# Patient Record
Sex: FEMALE | Race: WHITE | NOT HISPANIC OR LATINO | Employment: FULL TIME | URBAN - NONMETROPOLITAN AREA
[De-identification: names, ages, dates, MRNs, and addresses within clinical notes are randomized per-mention and may not be internally consistent; named-entity substitution may affect disease eponyms.]

---

## 2023-04-19 DIAGNOSIS — M06.9 RHEUMATOID ARTHRITIS, UNSPECIFIED (MULTI): ICD-10-CM

## 2023-04-19 DIAGNOSIS — E78.5 HYPERLIPIDEMIA, UNSPECIFIED: ICD-10-CM

## 2023-04-19 PROBLEM — K21.9 GERD (GASTROESOPHAGEAL REFLUX DISEASE): Status: ACTIVE | Noted: 2023-04-19

## 2023-04-19 PROBLEM — F41.9 ANXIETY AND DEPRESSION: Status: ACTIVE | Noted: 2023-04-19

## 2023-04-19 PROBLEM — F32.A ANXIETY AND DEPRESSION: Status: ACTIVE | Noted: 2023-04-19

## 2023-04-19 PROBLEM — I10 ESSENTIAL HYPERTENSION: Status: ACTIVE | Noted: 2023-04-19

## 2023-04-19 PROBLEM — M79.641 PAIN OF RIGHT HAND: Status: ACTIVE | Noted: 2023-04-19

## 2023-04-19 PROBLEM — E87.6 LOW BLOOD POTASSIUM: Status: ACTIVE | Noted: 2023-04-19

## 2023-04-19 PROBLEM — F43.20 ADJUSTMENT REACTION: Status: ACTIVE | Noted: 2023-04-19

## 2023-04-19 PROBLEM — R20.2 PARESTHESIA OF LOWER LIP: Status: ACTIVE | Noted: 2023-04-19

## 2023-04-19 PROBLEM — R76.8 ELEVATED RHEUMATOID FACTOR: Status: ACTIVE | Noted: 2023-04-19

## 2023-04-19 PROBLEM — R20.0 NUMBNESS OF RIGHT HAND: Status: ACTIVE | Noted: 2023-04-19

## 2023-04-19 PROBLEM — N89.8 VAGINAL IRRITATION: Status: ACTIVE | Noted: 2023-04-19

## 2023-04-19 PROBLEM — M19.049 DEGENERATIVE JOINT DISEASE OF HAND: Status: ACTIVE | Noted: 2023-04-19

## 2023-04-19 RX ORDER — ATORVASTATIN CALCIUM 40 MG/1
TABLET, FILM COATED ORAL
Qty: 90 TABLET | Refills: 3 | Status: SHIPPED | OUTPATIENT
Start: 2023-04-19 | End: 2023-06-05 | Stop reason: SDUPTHER

## 2023-04-19 RX ORDER — MELOXICAM 15 MG/1
TABLET ORAL
Qty: 30 TABLET | Refills: 7 | Status: SHIPPED | OUTPATIENT
Start: 2023-04-19 | End: 2023-06-05 | Stop reason: SDUPTHER

## 2023-04-19 RX ORDER — MELOXICAM 15 MG/1
15 TABLET ORAL DAILY PRN
COMMUNITY
End: 2023-06-05 | Stop reason: ALTCHOICE

## 2023-04-19 RX ORDER — ATORVASTATIN CALCIUM 40 MG/1
40 TABLET, FILM COATED ORAL DAILY
COMMUNITY
End: 2023-10-02 | Stop reason: SDUPTHER

## 2023-06-03 LAB
ALANINE AMINOTRANSFERASE (SGPT) (U/L) IN SER/PLAS: 9 U/L (ref 7–45)
ALBUMIN (G/DL) IN SER/PLAS: 4.1 G/DL (ref 3.4–5)
ALKALINE PHOSPHATASE (U/L) IN SER/PLAS: 61 U/L (ref 33–136)
ASPARTATE AMINOTRANSFERASE (SGOT) (U/L) IN SER/PLAS: 20 U/L (ref 9–39)
BILIRUBIN DIRECT (MG/DL) IN SER/PLAS: 0.2 MG/DL (ref 0–0.3)
BILIRUBIN TOTAL (MG/DL) IN SER/PLAS: 1.1 MG/DL (ref 0–1.2)
C REACTIVE PROTEIN (MG/L) IN SER/PLAS: 0.58 MG/DL
CALCIDIOL (25 OH VITAMIN D3) (NG/ML) IN SER/PLAS: 43 NG/ML
CREATININE (MG/DL) IN SER/PLAS: 0.81 MG/DL (ref 0.5–1.05)
ERYTHROCYTE DISTRIBUTION WIDTH (RATIO) BY AUTOMATED COUNT: 12.6 % (ref 11.5–14.5)
ERYTHROCYTE MEAN CORPUSCULAR HEMOGLOBIN CONCENTRATION (G/DL) BY AUTOMATED: 32.8 G/DL (ref 32–36)
ERYTHROCYTE MEAN CORPUSCULAR VOLUME (FL) BY AUTOMATED COUNT: 95 FL (ref 80–100)
ERYTHROCYTES (10*6/UL) IN BLOOD BY AUTOMATED COUNT: 4.23 X10E12/L (ref 4–5.2)
GFR FEMALE: 80 ML/MIN/1.73M2
HEMATOCRIT (%) IN BLOOD BY AUTOMATED COUNT: 40 % (ref 36–46)
HEMOGLOBIN (G/DL) IN BLOOD: 13.1 G/DL (ref 12–16)
LEUKOCYTES (10*3/UL) IN BLOOD BY AUTOMATED COUNT: 4.2 X10E9/L (ref 4.4–11.3)
NRBC (PER 100 WBCS) BY AUTOMATED COUNT: 0 /100 WBC (ref 0–0)
PLATELETS (10*3/UL) IN BLOOD AUTOMATED COUNT: 278 X10E9/L (ref 150–450)
PROTEIN TOTAL: 7.1 G/DL (ref 6.4–8.2)
SEDIMENTATION RATE, ERYTHROCYTE: 21 MM/H (ref 0–30)
UREA NITROGEN (MG/DL) IN SER/PLAS: 21 MG/DL (ref 6–23)

## 2023-06-04 RX ORDER — PANTOPRAZOLE SODIUM 40 MG/1
40 TABLET, DELAYED RELEASE ORAL DAILY
COMMUNITY
Start: 2014-07-23 | End: 2024-02-06 | Stop reason: SDUPTHER

## 2023-06-04 RX ORDER — ACETAMINOPHEN 500 MG
2 TABLET ORAL 2 TIMES DAILY
COMMUNITY
Start: 2018-12-18

## 2023-06-04 RX ORDER — HYDROXYCHLOROQUINE SULFATE 200 MG/1
200 TABLET, FILM COATED ORAL 2 TIMES DAILY
COMMUNITY
Start: 2021-06-28

## 2023-06-04 RX ORDER — ESCITALOPRAM OXALATE 20 MG/1
20 TABLET ORAL DAILY
COMMUNITY
Start: 2015-12-17 | End: 2023-09-29 | Stop reason: SDUPTHER

## 2023-06-04 RX ORDER — HYDROCHLOROTHIAZIDE 25 MG/1
25 TABLET ORAL DAILY
COMMUNITY
Start: 2021-03-19 | End: 2023-11-07

## 2023-06-05 ENCOUNTER — OFFICE VISIT (OUTPATIENT)
Dept: PRIMARY CARE | Facility: CLINIC | Age: 63
End: 2023-06-05
Payer: COMMERCIAL

## 2023-06-05 VITALS
OXYGEN SATURATION: 95 % | TEMPERATURE: 97.2 F | WEIGHT: 246.9 LBS | HEART RATE: 76 BPM | BODY MASS INDEX: 41.09 KG/M2 | SYSTOLIC BLOOD PRESSURE: 115 MMHG | DIASTOLIC BLOOD PRESSURE: 72 MMHG

## 2023-06-05 DIAGNOSIS — E78.5 HYPERLIPIDEMIA, UNSPECIFIED HYPERLIPIDEMIA TYPE: ICD-10-CM

## 2023-06-05 DIAGNOSIS — M05.79 RHEUMATOID ARTHRITIS INVOLVING MULTIPLE SITES WITH POSITIVE RHEUMATOID FACTOR (MULTI): ICD-10-CM

## 2023-06-05 DIAGNOSIS — M19.042 PRIMARY OSTEOARTHRITIS OF BOTH HANDS: ICD-10-CM

## 2023-06-05 DIAGNOSIS — D72.810 LYMPHOPENIA: Primary | ICD-10-CM

## 2023-06-05 DIAGNOSIS — Z00.00 ENCOUNTER FOR WELLNESS EXAMINATION IN ADULT: ICD-10-CM

## 2023-06-05 DIAGNOSIS — I10 ESSENTIAL HYPERTENSION: ICD-10-CM

## 2023-06-05 DIAGNOSIS — R76.8 ELEVATED RHEUMATOID FACTOR: ICD-10-CM

## 2023-06-05 DIAGNOSIS — J30.1 SEASONAL ALLERGIC RHINITIS DUE TO POLLEN: ICD-10-CM

## 2023-06-05 DIAGNOSIS — M19.041 PRIMARY OSTEOARTHRITIS OF BOTH HANDS: ICD-10-CM

## 2023-06-05 PROCEDURE — 99214 OFFICE O/P EST MOD 30 MIN: CPT | Performed by: FAMILY MEDICINE

## 2023-06-05 PROCEDURE — 1159F MED LIST DOCD IN RCRD: CPT | Performed by: FAMILY MEDICINE

## 2023-06-05 PROCEDURE — 3074F SYST BP LT 130 MM HG: CPT | Performed by: FAMILY MEDICINE

## 2023-06-05 PROCEDURE — 3078F DIAST BP <80 MM HG: CPT | Performed by: FAMILY MEDICINE

## 2023-06-05 PROCEDURE — 1036F TOBACCO NON-USER: CPT | Performed by: FAMILY MEDICINE

## 2023-06-05 RX ORDER — CHOLECALCIFEROL (VITAMIN D3) 50 MCG
50 TABLET ORAL DAILY
COMMUNITY

## 2023-06-05 ASSESSMENT — ANXIETY QUESTIONNAIRES
2. NOT BEING ABLE TO STOP OR CONTROL WORRYING: NOT AT ALL
6. BECOMING EASILY ANNOYED OR IRRITABLE: NOT AT ALL
5. BEING SO RESTLESS THAT IT IS HARD TO SIT STILL: NOT AT ALL
7. FEELING AFRAID AS IF SOMETHING AWFUL MIGHT HAPPEN: NOT AT ALL
4. TROUBLE RELAXING: NOT AT ALL
1. FEELING NERVOUS, ANXIOUS, OR ON EDGE: NOT AT ALL

## 2023-06-05 ASSESSMENT — ENCOUNTER SYMPTOMS
CARDIOVASCULAR NEGATIVE: 1
ALLERGIC/IMMUNOLOGIC NEGATIVE: 1
ENDOCRINE NEGATIVE: 1
ARTHRALGIAS: 1
CHEST TIGHTNESS: 0
SHORTNESS OF BREATH: 1
JOINT SWELLING: 1
GASTROINTESTINAL NEGATIVE: 1
HEMATOLOGIC/LYMPHATIC NEGATIVE: 1
EYES NEGATIVE: 1
NEUROLOGICAL NEGATIVE: 1
PSYCHIATRIC NEGATIVE: 1
CONSTITUTIONAL NEGATIVE: 1

## 2023-06-05 ASSESSMENT — PATIENT HEALTH QUESTIONNAIRE - PHQ9
SUM OF ALL RESPONSES TO PHQ9 QUESTIONS 1 AND 2: 0
1. LITTLE INTEREST OR PLEASURE IN DOING THINGS: NOT AT ALL
2. FEELING DOWN, DEPRESSED OR HOPELESS: NOT AT ALL

## 2023-06-05 NOTE — PATIENT INSTRUCTIONS
1.  Rheumatoid arthritis    We had a good discussion regards to your inflammatory arthritis.  You are currently taking Plaquenil.   would like to get a second opinion on your current treatment plan.  If you need a referral in order to get into see a new rheumatologist please let us know.  In the meantime I would stay on the Plaquenil and Tylenol    2.  Left wrist pain.  I am concerned you may have a small ganglion cyst along the side of the wrist.  I do think a splint at least at bedtime might help protect a little bit maybe it will get smaller if it starts to get larger probably should have you meet with a hand surgeon for further evaluate    3.  Hypertension.        The blood pressure is normal please stay on the hydrochlorothiazide 25 mg a day by lowering blood pressure we do reduce risk for heart attack and stroke    4.  Elevated cholesterol    Please stay on the atorvastatin 40 mg a day in addition try to eat a heart healthy diet a diet rich with 5-7 servings of fresh fruits and vegetables every day in addition to lean proteins avoiding simple sugars and fast foods.  Try to walk be active 30 minutes of activity every day is ideal certainly you would call if you have any chest pain chest pressure chest tightness with activities    We will have you get fasting labs at your convenience in 2 weeks    5.  Recent labs indicate a low white blood cell count.  I am not sure if this is from your Plaquenil but could be a lab error    I am recommending a repeat blood count in 2 weeks at that time you can have other labs done I would go fasting meaning you can have a large glass of water that morning but no food    6.  Please stay on current doses of Lexapro 20 mg a day.  Continue getting at least 6 hours of sleep at night.  Try to be outside [place] window for 30 minutes and natural sunlight every day.  Continue with a healthy relationship with your family and friends.  And continue with daily prayer and meditation if you  think your moods are worsening please call    7.  You are up-to-date with your mammogram and colonoscopy    8.  We will contact you with results of labs you will contact me if anything changes I will see you back in 6 months but am happy to see you sooner if needed

## 2023-06-05 NOTE — PROGRESS NOTES
Subjective   Patient ID: Brianna Olivo is a 63 y.o. female who presents for Follow-up (6 month follow up chol, anxiety).    HPI     Patient notes when she reduced her Plaquenil she felt the same amount of joint pain and experienced the same amount of swelling. She would like to stop seeing her rheumatologist. Patient denies chest tightness, but notes SOB occasionally. She denies SOB upon exertion. Patient has increased her walking. She notes her wrist is swollen and uncomfortable. Patient denies acid indigestion, and reports minor postnasal drip in the mornings.  Review of Systems   Constitutional: Negative.    HENT:  Positive for postnasal drip.    Eyes: Negative.    Respiratory:  Positive for shortness of breath. Negative for chest tightness.    Cardiovascular: Negative.    Gastrointestinal: Negative.    Endocrine: Negative.    Genitourinary: Negative.    Musculoskeletal:  Positive for arthralgias and joint swelling.   Skin: Negative.    Allergic/Immunologic: Negative.    Neurological: Negative.    Hematological: Negative.    Psychiatric/Behavioral: Negative.         Objective   /72 (BP Location: Left arm, Patient Position: Sitting, BP Cuff Size: Adult long)   Pulse 76   Temp 36.2 °C (97.2 °F) (Temporal)   Wt 112 kg (246 lb 14.4 oz)   SpO2 95%   BMI 41.09 kg/m²     Physical Exam  Constitutional:       Appearance: Normal appearance.   HENT:      Head: Normocephalic and atraumatic.      Right Ear: Tympanic membrane normal.      Left Ear: Tympanic membrane normal.      Nose: Nose normal.      Mouth/Throat:      Mouth: Mucous membranes are moist.      Pharynx: Oropharynx is clear.   Eyes:      Extraocular Movements: Extraocular movements intact.      Conjunctiva/sclera: Conjunctivae normal.      Pupils: Pupils are equal, round, and reactive to light.   Cardiovascular:      Rate and Rhythm: Normal rate and regular rhythm.      Pulses: Normal pulses.      Heart sounds: Normal heart sounds.   Pulmonary:       Effort: Pulmonary effort is normal.      Breath sounds: Normal breath sounds.   Abdominal:      General: Bowel sounds are normal.      Palpations: Abdomen is soft.   Musculoskeletal:         General: Normal range of motion.      Cervical back: Normal range of motion and neck supple.   Skin:     General: Skin is warm.      Comments: Small swollen area on lateral aspect of left wrist. Tender to palpate.    Neurological:      Mental Status: She is alert and oriented to person, place, and time.   Psychiatric:         Mood and Affect: Mood normal.         Behavior: Behavior normal.       Assessment/Plan   Diagnoses and all orders for this visit:  Lymphopenia  -     CBC and Auto Differential; Future  Hyperlipidemia, unspecified hyperlipidemia type  -     Lipid Panel; Future  -     Comprehensive Metabolic Panel; Future  -     Hemoglobin A1C; Future  -     TSH with reflex to Free T4 if abnormal; Future  -     Follow Up In [facility] - PCP; Future  Elevated rheumatoid factor  -     Hemoglobin A1C; Future  Rheumatoid arthritis involving multiple sites with positive rheumatoid factor (CMS/[facility])  Encounter for wellness examination in adult  -     Follow Up In [facility] - PCP; Future  Essential hypertension  Primary osteoarthritis of both hands  Seasonal allergic rhinitis due to pollen      1.  Rheumatoid arthritis    We had a good discussion regards to your inflammatory arthritis.  You are currently taking Plaquenil.   would like to get a second opinion on your current treatment plan.  If you need a referral in order to get into see a new rheumatologist please let us know.  In the meantime I would stay on the Plaquenil and Tylenol    2.  Left wrist pain.  I am concerned you may have a small ganglion cyst along the side of the wrist.  I do think a splint at least at bedtime might help protect a little bit maybe it will get smaller if it starts to get larger probably should have you meet with a hand  surgeon for further evaluate    3.  Hypertension.    The blood pressure is normal please stay on the hydrochlorothiazide 25 mg a day by lowering blood pressure we do reduce risk for heart attack and stroke    4.  Elevated cholesterol    Please stay on the atorvastatin 40 mg a day in addition try to eat a heart healthy diet a diet rich with 5-7 servings of fresh fruits and vegetables every day in addition to lean proteins avoiding simple sugars and fast foods.  Try to walk be active 30 minutes of activity every day is ideal certainly you would call if you have any chest pain chest pressure chest tightness with activities    We will have you get fasting labs at your convenience in 2 weeks    5.  Recent labs indicate a low white blood cell count.  I am not sure if this is from your Plaquenil but could be a lab error    I am recommending a repeat blood count in 2 weeks at that time you can have other labs done I would go fasting meaning you can have a large glass of water that morning but no food    6.  Please stay on current doses of Lexapro 20 mg a day.  Continue getting at least 6 hours of sleep at night.  Try to be outside [place] window for 30 minutes and natural sunlight every day.  Continue with a healthy relationship with your family and friends.  And continue with daily prayer and meditation if you think your moods are worsening please call    7.  You are up-to-date with your mammogram and colonoscopy    8.  We will contact you with results of labs you will contact me if anything changes I will see you back in 6 months but am happy to see you sooner if needed      Scribe Attestation  By signing my name below, ILing , Scrdarcy   attest that this documentation has been prepared under the direction and in the presence of Amandeep Mora MD.

## 2023-06-06 PROBLEM — D72.810 LYMPHOPENIA: Status: ACTIVE | Noted: 2023-06-06

## 2023-06-23 ENCOUNTER — APPOINTMENT (OUTPATIENT)
Dept: LAB | Facility: LAB | Age: 63
End: 2023-06-23
Payer: COMMERCIAL

## 2023-06-23 ENCOUNTER — LAB (OUTPATIENT)
Dept: LAB | Facility: LAB | Age: 63
End: 2023-06-23
Payer: COMMERCIAL

## 2023-06-23 DIAGNOSIS — R76.8 ELEVATED RHEUMATOID FACTOR: ICD-10-CM

## 2023-06-23 DIAGNOSIS — E78.5 HYPERLIPIDEMIA, UNSPECIFIED HYPERLIPIDEMIA TYPE: ICD-10-CM

## 2023-06-23 DIAGNOSIS — D72.810 LYMPHOPENIA: ICD-10-CM

## 2023-06-23 LAB
ALANINE AMINOTRANSFERASE (SGPT) (U/L) IN SER/PLAS: 8 U/L (ref 7–45)
ALBUMIN (G/DL) IN SER/PLAS: 4.1 G/DL (ref 3.4–5)
ALKALINE PHOSPHATASE (U/L) IN SER/PLAS: 67 U/L (ref 33–136)
ANION GAP IN SER/PLAS: 14 MMOL/L (ref 10–20)
ASPARTATE AMINOTRANSFERASE (SGOT) (U/L) IN SER/PLAS: 19 U/L (ref 9–39)
BASOPHILS (10*3/UL) IN BLOOD BY AUTOMATED COUNT: 0.04 X10E9/L (ref 0–0.1)
BASOPHILS/100 LEUKOCYTES IN BLOOD BY AUTOMATED COUNT: 0.7 % (ref 0–2)
BILIRUBIN TOTAL (MG/DL) IN SER/PLAS: 1 MG/DL (ref 0–1.2)
CALCIUM (MG/DL) IN SER/PLAS: 10.2 MG/DL (ref 8.6–10.6)
CARBON DIOXIDE, TOTAL (MMOL/L) IN SER/PLAS: 29 MMOL/L (ref 21–32)
CHLORIDE (MMOL/L) IN SER/PLAS: 103 MMOL/L (ref 98–107)
CHOLESTEROL (MG/DL) IN SER/PLAS: 175 MG/DL (ref 0–199)
CHOLESTEROL IN HDL (MG/DL) IN SER/PLAS: 65.6 MG/DL
CHOLESTEROL/HDL RATIO: 2.7
CREATININE (MG/DL) IN SER/PLAS: 0.88 MG/DL (ref 0.5–1.05)
EOSINOPHILS (10*3/UL) IN BLOOD BY AUTOMATED COUNT: 0.14 X10E9/L (ref 0–0.7)
EOSINOPHILS/100 LEUKOCYTES IN BLOOD BY AUTOMATED COUNT: 2.5 % (ref 0–6)
ERYTHROCYTE DISTRIBUTION WIDTH (RATIO) BY AUTOMATED COUNT: 13 % (ref 11.5–14.5)
ERYTHROCYTE MEAN CORPUSCULAR HEMOGLOBIN CONCENTRATION (G/DL) BY AUTOMATED: 32.4 G/DL (ref 32–36)
ERYTHROCYTE MEAN CORPUSCULAR VOLUME (FL) BY AUTOMATED COUNT: 96 FL (ref 80–100)
ERYTHROCYTES (10*6/UL) IN BLOOD BY AUTOMATED COUNT: 4.2 X10E12/L (ref 4–5.2)
ESTIMATED AVERAGE GLUCOSE FOR HBA1C: 108 MG/DL
GFR FEMALE: 73 ML/MIN/1.73M2
GLUCOSE (MG/DL) IN SER/PLAS: 98 MG/DL (ref 74–99)
HEMATOCRIT (%) IN BLOOD BY AUTOMATED COUNT: 40.4 % (ref 36–46)
HEMOGLOBIN (G/DL) IN BLOOD: 13.1 G/DL (ref 12–16)
HEMOGLOBIN A1C/HEMOGLOBIN TOTAL IN BLOOD: 5.4 %
IMMATURE GRANULOCYTES/100 LEUKOCYTES IN BLOOD BY AUTOMATED COUNT: 0.5 % (ref 0–0.9)
LDL: 94 MG/DL (ref 0–99)
LEUKOCYTES (10*3/UL) IN BLOOD BY AUTOMATED COUNT: 5.7 X10E9/L (ref 4.4–11.3)
LYMPHOCYTES (10*3/UL) IN BLOOD BY AUTOMATED COUNT: 1.55 X10E9/L (ref 1.2–4.8)
LYMPHOCYTES/100 LEUKOCYTES IN BLOOD BY AUTOMATED COUNT: 27.3 % (ref 13–44)
MONOCYTES (10*3/UL) IN BLOOD BY AUTOMATED COUNT: 0.59 X10E9/L (ref 0.1–1)
MONOCYTES/100 LEUKOCYTES IN BLOOD BY AUTOMATED COUNT: 10.4 % (ref 2–10)
NEUTROPHILS (10*3/UL) IN BLOOD BY AUTOMATED COUNT: 3.33 X10E9/L (ref 1.2–7.7)
NEUTROPHILS/100 LEUKOCYTES IN BLOOD BY AUTOMATED COUNT: 58.6 % (ref 40–80)
NRBC (PER 100 WBCS) BY AUTOMATED COUNT: 0 /100 WBC (ref 0–0)
PLATELETS (10*3/UL) IN BLOOD AUTOMATED COUNT: 289 X10E9/L (ref 150–450)
POTASSIUM (MMOL/L) IN SER/PLAS: 3.9 MMOL/L (ref 3.5–5.3)
PROTEIN TOTAL: 7.1 G/DL (ref 6.4–8.2)
SODIUM (MMOL/L) IN SER/PLAS: 142 MMOL/L (ref 136–145)
THYROTROPIN (MIU/L) IN SER/PLAS BY DETECTION LIMIT <= 0.05 MIU/L: 1.2 MIU/L (ref 0.44–3.98)
TRIGLYCERIDE (MG/DL) IN SER/PLAS: 79 MG/DL (ref 0–149)
UREA NITROGEN (MG/DL) IN SER/PLAS: 23 MG/DL (ref 6–23)
VLDL: 16 MG/DL (ref 0–40)

## 2023-06-23 PROCEDURE — 84443 ASSAY THYROID STIM HORMONE: CPT

## 2023-06-23 PROCEDURE — 83036 HEMOGLOBIN GLYCOSYLATED A1C: CPT

## 2023-06-23 PROCEDURE — 85025 COMPLETE CBC W/AUTO DIFF WBC: CPT

## 2023-06-23 PROCEDURE — 80053 COMPREHEN METABOLIC PANEL: CPT

## 2023-06-23 PROCEDURE — 80061 LIPID PANEL: CPT

## 2023-06-23 PROCEDURE — 36415 COLL VENOUS BLD VENIPUNCTURE: CPT

## 2023-09-29 ENCOUNTER — TELEPHONE (OUTPATIENT)
Dept: PRIMARY CARE | Facility: CLINIC | Age: 63
End: 2023-09-29

## 2023-09-29 DIAGNOSIS — E78.2 MIXED HYPERLIPIDEMIA: Primary | ICD-10-CM

## 2023-09-29 DIAGNOSIS — F43.20 ADJUSTMENT DISORDER, UNSPECIFIED TYPE: ICD-10-CM

## 2023-09-29 RX ORDER — ESCITALOPRAM OXALATE 20 MG/1
20 TABLET ORAL DAILY
Qty: 90 TABLET | Refills: 3 | Status: SHIPPED | OUTPATIENT
Start: 2023-09-29 | End: 2023-10-05

## 2023-09-29 NOTE — TELEPHONE ENCOUNTER
Calling with a drug interaction of Lexapro and plaquenil. Arrhythmia and serotonin level. She is aware that pt has been on this for years but they have a new system so she had to call and verify.

## 2023-10-02 ENCOUNTER — TELEPHONE (OUTPATIENT)
Dept: PRIMARY CARE | Facility: CLINIC | Age: 63
End: 2023-10-02

## 2023-10-02 DIAGNOSIS — E78.5 HYPERLIPIDEMIA, UNSPECIFIED HYPERLIPIDEMIA TYPE: Primary | ICD-10-CM

## 2023-10-02 RX ORDER — ATORVASTATIN CALCIUM 40 MG/1
40 TABLET, FILM COATED ORAL DAILY
Qty: 90 TABLET | Refills: 3 | Status: SHIPPED | OUTPATIENT
Start: 2023-10-02 | End: 2024-10-01

## 2023-10-02 RX ORDER — ATORVASTATIN CALCIUM 40 MG/1
40 TABLET, FILM COATED ORAL DAILY
Qty: 90 TABLET | Refills: 2 | Status: CANCELLED | OUTPATIENT
Start: 2023-10-02 | End: 2024-10-01

## 2023-11-07 DIAGNOSIS — I10 ESSENTIAL (PRIMARY) HYPERTENSION: ICD-10-CM

## 2023-11-07 RX ORDER — HYDROCHLOROTHIAZIDE 25 MG/1
25 TABLET ORAL DAILY
Qty: 90 TABLET | Refills: 3 | Status: SHIPPED | OUTPATIENT
Start: 2023-11-07

## 2023-12-11 ENCOUNTER — APPOINTMENT (OUTPATIENT)
Dept: PRIMARY CARE | Facility: CLINIC | Age: 63
End: 2023-12-11
Payer: COMMERCIAL

## 2024-02-06 ENCOUNTER — OFFICE VISIT (OUTPATIENT)
Dept: PRIMARY CARE | Facility: CLINIC | Age: 64
End: 2024-02-06
Payer: COMMERCIAL

## 2024-02-06 VITALS
BODY MASS INDEX: 41.63 KG/M2 | TEMPERATURE: 97.7 F | DIASTOLIC BLOOD PRESSURE: 75 MMHG | RESPIRATION RATE: 14 BRPM | SYSTOLIC BLOOD PRESSURE: 140 MMHG | HEIGHT: 65 IN | WEIGHT: 249.9 LBS | OXYGEN SATURATION: 92 % | HEART RATE: 74 BPM

## 2024-02-06 DIAGNOSIS — M05.79 RHEUMATOID ARTHRITIS INVOLVING MULTIPLE SITES WITH POSITIVE RHEUMATOID FACTOR (MULTI): ICD-10-CM

## 2024-02-06 DIAGNOSIS — E78.5 HYPERLIPIDEMIA, UNSPECIFIED HYPERLIPIDEMIA TYPE: ICD-10-CM

## 2024-02-06 DIAGNOSIS — Z00.00 PHYSICAL EXAM, ANNUAL: ICD-10-CM

## 2024-02-06 DIAGNOSIS — E55.9 VITAMIN D DEFICIENCY: ICD-10-CM

## 2024-02-06 DIAGNOSIS — I10 ESSENTIAL HYPERTENSION: ICD-10-CM

## 2024-02-06 DIAGNOSIS — F33.41 RECURRENT MAJOR DEPRESSIVE DISORDER, IN PARTIAL REMISSION (CMS-HCC): ICD-10-CM

## 2024-02-06 DIAGNOSIS — Z00.00 MEDICARE ANNUAL WELLNESS VISIT, SUBSEQUENT: Primary | ICD-10-CM

## 2024-02-06 DIAGNOSIS — K21.9 GASTROESOPHAGEAL REFLUX DISEASE WITHOUT ESOPHAGITIS: ICD-10-CM

## 2024-02-06 DIAGNOSIS — Z12.11 ENCOUNTER FOR SCREENING FOR MALIGNANT NEOPLASM OF COLON: ICD-10-CM

## 2024-02-06 DIAGNOSIS — Z12.31 BREAST CANCER SCREENING BY MAMMOGRAM: ICD-10-CM

## 2024-02-06 PROCEDURE — 1159F MED LIST DOCD IN RCRD: CPT | Performed by: FAMILY MEDICINE

## 2024-02-06 PROCEDURE — 99214 OFFICE O/P EST MOD 30 MIN: CPT | Performed by: FAMILY MEDICINE

## 2024-02-06 PROCEDURE — 1160F RVW MEDS BY RX/DR IN RCRD: CPT | Performed by: FAMILY MEDICINE

## 2024-02-06 PROCEDURE — 1125F AMNT PAIN NOTED PAIN PRSNT: CPT | Performed by: FAMILY MEDICINE

## 2024-02-06 PROCEDURE — 1036F TOBACCO NON-USER: CPT | Performed by: FAMILY MEDICINE

## 2024-02-06 PROCEDURE — 3078F DIAST BP <80 MM HG: CPT | Performed by: FAMILY MEDICINE

## 2024-02-06 PROCEDURE — 3008F BODY MASS INDEX DOCD: CPT | Performed by: FAMILY MEDICINE

## 2024-02-06 PROCEDURE — G0439 PPPS, SUBSEQ VISIT: HCPCS | Performed by: FAMILY MEDICINE

## 2024-02-06 PROCEDURE — 3077F SYST BP >= 140 MM HG: CPT | Performed by: FAMILY MEDICINE

## 2024-02-06 RX ORDER — PANTOPRAZOLE SODIUM 40 MG/1
40 TABLET, DELAYED RELEASE ORAL DAILY
Qty: 90 TABLET | Refills: 3 | Status: SHIPPED | OUTPATIENT
Start: 2024-02-06 | End: 2025-02-05

## 2024-02-06 ASSESSMENT — ENCOUNTER SYMPTOMS
APPETITE CHANGE: 1
ACTIVITY CHANGE: 1
NEUROLOGICAL NEGATIVE: 1
GASTROINTESTINAL NEGATIVE: 1
CARDIOVASCULAR NEGATIVE: 1
ENDOCRINE NEGATIVE: 1
RESPIRATORY NEGATIVE: 1
MUSCULOSKELETAL NEGATIVE: 1
EYES NEGATIVE: 1
HEMATOLOGIC/LYMPHATIC NEGATIVE: 1
PSYCHIATRIC NEGATIVE: 1
ALLERGIC/IMMUNOLOGIC NEGATIVE: 1

## 2024-02-06 ASSESSMENT — PATIENT HEALTH QUESTIONNAIRE - PHQ9
4. FEELING TIRED OR HAVING LITTLE ENERGY: NOT AT ALL
6. FEELING BAD ABOUT YOURSELF - OR THAT YOU ARE A FAILURE OR HAVE LET YOURSELF OR YOUR FAMILY DOWN: NOT AT ALL
SUM OF ALL RESPONSES TO PHQ QUESTIONS 1-9: 0
5. POOR APPETITE OR OVEREATING: NOT AT ALL
10. IF YOU CHECKED OFF ANY PROBLEMS, HOW DIFFICULT HAVE THESE PROBLEMS MADE IT FOR YOU TO DO YOUR WORK, TAKE CARE OF THINGS AT HOME, OR GET ALONG WITH OTHER PEOPLE: NOT DIFFICULT AT ALL
SUM OF ALL RESPONSES TO PHQ9 QUESTIONS 1 AND 2: 0
3. TROUBLE FALLING OR STAYING ASLEEP OR SLEEPING TOO MUCH: NOT AT ALL
9. THOUGHTS THAT YOU WOULD BE BETTER OFF DEAD, OR OF HURTING YOURSELF: NOT AT ALL
2. FEELING DOWN, DEPRESSED OR HOPELESS: NOT AT ALL
7. TROUBLE CONCENTRATING ON THINGS, SUCH AS READING THE NEWSPAPER OR WATCHING TELEVISION: NOT AT ALL
8. MOVING OR SPEAKING SO SLOWLY THAT OTHER PEOPLE COULD HAVE NOTICED. OR THE OPPOSITE, BEING SO FIGETY OR RESTLESS THAT YOU HAVE BEEN MOVING AROUND A LOT MORE THAN USUAL: NOT AT ALL
1. LITTLE INTEREST OR PLEASURE IN DOING THINGS: NOT AT ALL

## 2024-02-06 ASSESSMENT — ANXIETY QUESTIONNAIRES
GAD7 TOTAL SCORE: 0
3. WORRYING TOO MUCH ABOUT DIFFERENT THINGS: NOT AT ALL
5. BEING SO RESTLESS THAT IT IS HARD TO SIT STILL: NOT AT ALL
1. FEELING NERVOUS, ANXIOUS, OR ON EDGE: NOT AT ALL
7. FEELING AFRAID AS IF SOMETHING AWFUL MIGHT HAPPEN: NOT AT ALL
4. TROUBLE RELAXING: NOT AT ALL
6. BECOMING EASILY ANNOYED OR IRRITABLE: NOT AT ALL
2. NOT BEING ABLE TO STOP OR CONTROL WORRYING: NOT AT ALL

## 2024-02-06 NOTE — PATIENT INSTRUCTIONS
1.  Medicare wellness visit    Today in the office you had your annual wellness visit    We will give you an order for mammogram for breast cancer screening    We will give you an order for colonoscopy for colon cancer screening    You are up-to-date with this years flu shot you have also received the pneumonia vaccine.  Please discuss with your pharmacy getting the shingles vaccine    Please have fasting labs at your convenience we will check kidney function liver function blood sugar cholesterol vitamin D we will call you with those results    Continue on the atorvastatin to help lower cholesterol which reduces risk for heart attack and stroke    Equally as important eat heart healthy diet a good goal 5-7 servings of fresh fruit and vegetable every day along with lean protein avoid simple sugars avoid fast foods    Good job getting back to an exercise program keep it 30 minutes 5 days a week would be ideal this not only will help your heart it will help lower your blood pressure and lower your cholesterol and make you feel better and help you lose weight    Continue on the pantoprazole to prevent acid indigestion    Continue on the Lexapro 20 mg a day to help with overall mood continue getting at least 6 hours of sleep at night.  Try to be outside or being a window for 30 minutes and natural sunlight every day.  Continue with a healthy relationship with your family and friends.  Continue with daily prayer and meditation if you think your moods are worsening please call me    I think it is a good idea to meet with a new rheumatologist if you need a referral let me know continue on your hydroxychloroquine for now    Continue on hydrochlorothiazide to control blood pressure blood pressure was slightly elevated today if possible check blood pressures at home send me those results after 2 weeks the goal for the top number should be consistently less than 140 bottom number consistently less than 80    If you otherwise  stay healthy and all your labs returned normal I will see you back in 6 months but am happy to see you sooner if needed

## 2024-02-06 NOTE — PROGRESS NOTES
"Subjective   Patient ID: Brianna Olivo is a 64 y.o. female who presents for Annual Exam (CPE).    HPI     The patient is trying to stay healthy and active. The patient is trying to eat more healthier too for weight management. She has been incorporating her green leafy vegetables and proteins. She is currently walking as exercise.    The patient denies any changes in vision, hearing or dental.     The patient maintains they do not have any chest pain, chest tightness or shortness of breath.    They do not experience nausea, emesis, changes in bowel movements or dyspepsia.    The patient's colonoscopy is up to date.    The patient's mammogram is not up to date.    The patient's vaccinations are up to date.      Review of Systems   Constitutional:  Positive for activity change and appetite change.   HENT: Negative.     Eyes: Negative.    Respiratory: Negative.     Cardiovascular: Negative.    Gastrointestinal: Negative.    Endocrine: Negative.    Genitourinary: Negative.    Musculoskeletal: Negative.    Skin: Negative.    Allergic/Immunologic: Negative.    Neurological: Negative.    Hematological: Negative.    Psychiatric/Behavioral: Negative.         Objective   /75 (BP Location: Left arm, Patient Position: Sitting, BP Cuff Size: Adult)   Pulse 74   Temp 36.5 °C (97.7 °F) (Temporal)   Resp 14   Ht 1.651 m (5' 5\")   Wt 113 kg (249 lb 14.4 oz)   SpO2 92%   BMI 41.59 kg/m²     Physical Exam  Constitutional:       Appearance: Normal appearance.   HENT:      Head: Normocephalic and atraumatic.      Nose: Nose normal.   Eyes:      Extraocular Movements: Extraocular movements intact.      Conjunctiva/sclera: Conjunctivae normal.      Pupils: Pupils are equal, round, and reactive to light.   Cardiovascular:      Rate and Rhythm: Normal rate and regular rhythm.      Pulses: Normal pulses.      Heart sounds: Normal heart sounds.   Pulmonary:      Effort: Pulmonary effort is normal.      Breath sounds: Normal " breath sounds.   Abdominal:      General: Bowel sounds are normal.      Palpations: Abdomen is soft.   Genitourinary:     General: Normal vulva.      Rectum: Normal.   Musculoskeletal:         General: Normal range of motion.      Cervical back: Normal range of motion and neck supple.   Skin:     General: Skin is warm.   Neurological:      Mental Status: She is alert and oriented to person, place, and time.   Psychiatric:         Mood and Affect: Mood normal.         Behavior: Behavior normal.         Thought Content: Thought content normal.         Judgment: Judgment normal.         Assessment/Plan          1. Medicare annual wellness visit, subsequent        2. Hyperlipidemia, unspecified hyperlipidemia type  Follow Up In [facility] - PCP    CBC and Auto Differential    Comprehensive metabolic panel    Lipid panel    Follow Up In [facility] - PCP - Health Maintenance      3. Vitamin D deficiency  Vitamin D 25-Hydroxy,Total (for eval of Vitamin D levels)      4. Physical exam, annual  Lipid panel    Tsh With Reflex To Free T4 If Abnormal    Hemoglobin A1C      5. BMI 40.0-44.9, adult (CMS/[facility])  Follow Up In [facility] - PCP - Health Maintenance      6. Breast cancer screening by mammogram  BI mammo bilateral screening tomosynthesis      7. Rheumatoid arthritis involving multiple sites with positive rheumatoid factor (CMS/[facility])  Follow Up In [facility] - PCP - Health Maintenance      8. Encounter for screening for malignant neoplasm of colon  Colonoscopy Screening; Average Risk Patient      9. Gastroesophageal reflux disease without esophagitis  pantoprazole (ProtoNix) 40 mg EC tablet      10. Essential hypertension        11. Recurrent major depressive disorder, in partial remission (CMS/[facility])            1.  Medicare wellness visit    Today in the office you had your annual wellness visit    We will give you an order for mammogram for breast cancer screening    We will give  you an order for colonoscopy for colon cancer screening    You are up-to-date with this years flu shot you have also received the pneumonia vaccine.  Please discuss with your pharmacy getting the shingles vaccine    Please have fasting labs at your convenience we will check kidney function liver function blood sugar cholesterol vitamin D we will call you with those results    Continue on the atorvastatin to help lower cholesterol which reduces risk for heart attack and stroke    Equally as important eat heart healthy diet a good goal 5-7 servings of fresh fruit and vegetable every day along with lean protein avoid simple sugars avoid fast foods    Good job getting back to an exercise program keep it 30 minutes 5 days a week would be ideal this not only will help your heart it will help lower your blood pressure and lower your cholesterol and make you feel better and help you lose weight    Continue on the pantoprazole to prevent acid indigestion    Continue on the Lexapro 20 mg a day to help with overall mood continue getting at least 6 hours of sleep at night.  Try to be outside or being a window for 30 minutes and natural sunlight every day.  Continue with a healthy relationship with your family and friends.  Continue with daily prayer and meditation if you think your moods are worsening please call me    I think it is a good idea to meet with a new rheumatologist if you need a referral let me know continue on your hydroxychloroquine for now    Continue on hydrochlorothiazide to control blood pressure blood pressure was slightly elevated today if possible check blood pressures at home send me those results after 2 weeks the goal for the top number should be consistently less than 140 bottom number consistently less than 80    If you otherwise stay healthy and all your labs returned normal I will see you back in 6 months but am happy to see you sooner if needed    Follow-up in 6 months or sooner if there are any  concerns.    Scribe Attestation  By signing my name below, I, Lizeth Null, Scrdarcy   attest that this documentation has been prepared under the direction and in the presence of Amandeep Mora MD.

## 2024-02-08 PROBLEM — Z12.31 BREAST CANCER SCREENING BY MAMMOGRAM: Status: ACTIVE | Noted: 2024-02-08

## 2024-02-08 PROBLEM — E55.9 VITAMIN D DEFICIENCY: Status: ACTIVE | Noted: 2024-02-08

## 2024-02-08 PROBLEM — Z12.11 ENCOUNTER FOR SCREENING FOR MALIGNANT NEOPLASM OF COLON: Status: ACTIVE | Noted: 2024-02-08

## 2024-02-08 PROBLEM — F33.41 RECURRENT MAJOR DEPRESSIVE DISORDER, IN PARTIAL REMISSION (CMS-HCC): Status: ACTIVE | Noted: 2024-02-08

## 2024-02-08 PROBLEM — Z00.00 PHYSICAL EXAM, ANNUAL: Status: ACTIVE | Noted: 2024-02-08

## 2024-02-16 ENCOUNTER — HOSPITAL ENCOUNTER (OUTPATIENT)
Dept: RADIOLOGY | Facility: CLINIC | Age: 64
Discharge: HOME | End: 2024-02-16
Payer: COMMERCIAL

## 2024-02-16 ENCOUNTER — LAB (OUTPATIENT)
Dept: LAB | Facility: LAB | Age: 64
End: 2024-02-16
Payer: COMMERCIAL

## 2024-02-16 VITALS — BODY MASS INDEX: 41.48 KG/M2 | HEIGHT: 65 IN | WEIGHT: 249 LBS

## 2024-02-16 DIAGNOSIS — E78.5 HYPERLIPIDEMIA, UNSPECIFIED HYPERLIPIDEMIA TYPE: ICD-10-CM

## 2024-02-16 DIAGNOSIS — E55.9 VITAMIN D DEFICIENCY: ICD-10-CM

## 2024-02-16 DIAGNOSIS — Z00.00 PHYSICAL EXAM, ANNUAL: ICD-10-CM

## 2024-02-16 DIAGNOSIS — Z12.31 BREAST CANCER SCREENING BY MAMMOGRAM: ICD-10-CM

## 2024-02-16 LAB
BASOPHILS # BLD AUTO: 0.05 X10*3/UL (ref 0–0.1)
BASOPHILS NFR BLD AUTO: 0.9 %
EOSINOPHIL # BLD AUTO: 0.15 X10*3/UL (ref 0–0.7)
EOSINOPHIL NFR BLD AUTO: 2.7 %
ERYTHROCYTE [DISTWIDTH] IN BLOOD BY AUTOMATED COUNT: 13.1 % (ref 11.5–14.5)
EST. AVERAGE GLUCOSE BLD GHB EST-MCNC: 105 MG/DL
HBA1C MFR BLD: 5.3 %
HCT VFR BLD AUTO: 42.3 % (ref 36–46)
HGB BLD-MCNC: 14.2 G/DL (ref 12–16)
IMM GRANULOCYTES # BLD AUTO: 0.02 X10*3/UL (ref 0–0.7)
IMM GRANULOCYTES NFR BLD AUTO: 0.4 % (ref 0–0.9)
LYMPHOCYTES # BLD AUTO: 1.47 X10*3/UL (ref 1.2–4.8)
LYMPHOCYTES NFR BLD AUTO: 26.3 %
MCH RBC QN AUTO: 32.3 PG (ref 26–34)
MCHC RBC AUTO-ENTMCNC: 33.6 G/DL (ref 32–36)
MCV RBC AUTO: 96 FL (ref 80–100)
MONOCYTES # BLD AUTO: 0.6 X10*3/UL (ref 0.1–1)
MONOCYTES NFR BLD AUTO: 10.7 %
NEUTROPHILS # BLD AUTO: 3.31 X10*3/UL (ref 1.2–7.7)
NEUTROPHILS NFR BLD AUTO: 59 %
NRBC BLD-RTO: 0 /100 WBCS (ref 0–0)
PLATELET # BLD AUTO: 276 X10*3/UL (ref 150–450)
RBC # BLD AUTO: 4.4 X10*6/UL (ref 4–5.2)
WBC # BLD AUTO: 5.6 X10*3/UL (ref 4.4–11.3)

## 2024-02-16 PROCEDURE — 36415 COLL VENOUS BLD VENIPUNCTURE: CPT

## 2024-02-16 PROCEDURE — 80053 COMPREHEN METABOLIC PANEL: CPT

## 2024-02-16 PROCEDURE — 77063 BREAST TOMOSYNTHESIS BI: CPT | Performed by: RADIOLOGY

## 2024-02-16 PROCEDURE — 85025 COMPLETE CBC W/AUTO DIFF WBC: CPT

## 2024-02-16 PROCEDURE — 77067 SCR MAMMO BI INCL CAD: CPT | Performed by: RADIOLOGY

## 2024-02-16 PROCEDURE — 82306 VITAMIN D 25 HYDROXY: CPT

## 2024-02-16 PROCEDURE — 80061 LIPID PANEL: CPT

## 2024-02-16 PROCEDURE — 77067 SCR MAMMO BI INCL CAD: CPT

## 2024-02-16 PROCEDURE — 83036 HEMOGLOBIN GLYCOSYLATED A1C: CPT

## 2024-02-16 PROCEDURE — 84443 ASSAY THYROID STIM HORMONE: CPT

## 2024-02-17 LAB
25(OH)D3 SERPL-MCNC: 42 NG/ML (ref 30–100)
ALBUMIN SERPL BCP-MCNC: 4.2 G/DL (ref 3.4–5)
ALP SERPL-CCNC: 61 U/L (ref 33–136)
ALT SERPL W P-5'-P-CCNC: 11 U/L (ref 7–45)
ANION GAP SERPL CALC-SCNC: 13 MMOL/L (ref 10–20)
AST SERPL W P-5'-P-CCNC: 20 U/L (ref 9–39)
BILIRUB SERPL-MCNC: 0.8 MG/DL (ref 0–1.2)
BUN SERPL-MCNC: 24 MG/DL (ref 6–23)
CALCIUM SERPL-MCNC: 10.3 MG/DL (ref 8.6–10.6)
CHLORIDE SERPL-SCNC: 105 MMOL/L (ref 98–107)
CHOLEST SERPL-MCNC: 167 MG/DL (ref 0–199)
CHOLESTEROL/HDL RATIO: 2.4
CO2 SERPL-SCNC: 29 MMOL/L (ref 21–32)
CREAT SERPL-MCNC: 0.82 MG/DL (ref 0.5–1.05)
EGFRCR SERPLBLD CKD-EPI 2021: 79 ML/MIN/1.73M*2
GLUCOSE SERPL-MCNC: 101 MG/DL (ref 74–99)
HDLC SERPL-MCNC: 70.6 MG/DL
LDLC SERPL CALC-MCNC: 84 MG/DL
NON HDL CHOLESTEROL: 96 MG/DL (ref 0–149)
POTASSIUM SERPL-SCNC: 4.2 MMOL/L (ref 3.5–5.3)
PROT SERPL-MCNC: 7 G/DL (ref 6.4–8.2)
SODIUM SERPL-SCNC: 143 MMOL/L (ref 136–145)
TRIGL SERPL-MCNC: 64 MG/DL (ref 0–149)
TSH SERPL-ACNC: 1.17 MIU/L (ref 0.44–3.98)
VLDL: 13 MG/DL (ref 0–40)

## 2024-02-18 DIAGNOSIS — M05.79 RHEUMATOID ARTHRITIS INVOLVING MULTIPLE SITES WITH POSITIVE RHEUMATOID FACTOR (MULTI): ICD-10-CM

## 2024-02-18 DIAGNOSIS — E78.2 MIXED HYPERLIPIDEMIA: Primary | ICD-10-CM

## 2024-02-18 DIAGNOSIS — R73.01 ELEVATED FASTING BLOOD SUGAR: ICD-10-CM

## 2024-02-18 DIAGNOSIS — E55.9 VITAMIN D DEFICIENCY: ICD-10-CM

## 2024-02-18 DIAGNOSIS — Z00.00 WELLNESS EXAMINATION: ICD-10-CM

## 2024-02-18 NOTE — RESULT ENCOUNTER NOTE
Recent labs indicate mild elevated fasting blood sugar 101.  Normal fasting blood sugar being less than 100.  Electrolytes are normal kidney function tests are normal liver enzymes are normal.  Total cholesterol normal at 167.  LDL cholesterol normal at 84.  Triglycerides normal at 64.  Please stay on current cholesterol medication by lowering your cholesterol you are reducing risk for heart attack and stroke.  Thyroid function test is normal.  Vitamin D level is normal.  Hemoglobin A1c is normal at 5.3 indicating no diabetes.  Blood counts are normal indicating no anemia.  Please stay on all current medications if possible repeat blood work prior to your next appointment I will place the orders

## 2024-04-29 ENCOUNTER — TELEPHONE (OUTPATIENT)
Dept: PRIMARY CARE | Facility: CLINIC | Age: 64
End: 2024-04-29
Payer: COMMERCIAL

## 2024-04-29 NOTE — TELEPHONE ENCOUNTER
Patient scheduled an appointment with you on May 28th for symptoms she is having such as fatigue, and night sweats. She is asking to have some lab work done prior if you could place some orders.

## 2024-04-30 DIAGNOSIS — M05.79 RHEUMATOID ARTHRITIS INVOLVING MULTIPLE SITES WITH POSITIVE RHEUMATOID FACTOR (MULTI): ICD-10-CM

## 2024-04-30 DIAGNOSIS — E78.2 MIXED HYPERLIPIDEMIA: ICD-10-CM

## 2024-04-30 DIAGNOSIS — R53.83 OTHER FATIGUE: Primary | ICD-10-CM

## 2024-04-30 DIAGNOSIS — E55.9 VITAMIN D DEFICIENCY: ICD-10-CM

## 2024-04-30 DIAGNOSIS — R73.01 ELEVATED FASTING BLOOD SUGAR: ICD-10-CM

## 2024-05-01 ENCOUNTER — LAB (OUTPATIENT)
Dept: LAB | Facility: LAB | Age: 64
End: 2024-05-01
Payer: COMMERCIAL

## 2024-05-01 DIAGNOSIS — R73.01 ELEVATED FASTING BLOOD SUGAR: ICD-10-CM

## 2024-05-01 DIAGNOSIS — R53.83 OTHER FATIGUE: ICD-10-CM

## 2024-05-01 DIAGNOSIS — M05.79 RHEUMATOID ARTHRITIS INVOLVING MULTIPLE SITES WITH POSITIVE RHEUMATOID FACTOR (MULTI): ICD-10-CM

## 2024-05-01 DIAGNOSIS — E55.9 VITAMIN D DEFICIENCY: ICD-10-CM

## 2024-05-01 DIAGNOSIS — E78.2 MIXED HYPERLIPIDEMIA: ICD-10-CM

## 2024-05-01 LAB
25(OH)D3 SERPL-MCNC: 42 NG/ML (ref 30–100)
ALBUMIN SERPL BCP-MCNC: 3.4 G/DL (ref 3.4–5)
ALP SERPL-CCNC: 64 U/L (ref 33–136)
ALT SERPL W P-5'-P-CCNC: 7 U/L (ref 7–45)
ANION GAP SERPL CALC-SCNC: 14 MMOL/L (ref 10–20)
AST SERPL W P-5'-P-CCNC: 17 U/L (ref 9–39)
BASOPHILS # BLD AUTO: 0.03 X10*3/UL (ref 0–0.1)
BASOPHILS NFR BLD AUTO: 0.2 %
BILIRUB SERPL-MCNC: 0.7 MG/DL (ref 0–1.2)
BUN SERPL-MCNC: 19 MG/DL (ref 6–23)
CALCIUM SERPL-MCNC: 9.7 MG/DL (ref 8.6–10.6)
CHLORIDE SERPL-SCNC: 99 MMOL/L (ref 98–107)
CHOLEST SERPL-MCNC: 123 MG/DL (ref 0–199)
CHOLESTEROL/HDL RATIO: 3.1
CO2 SERPL-SCNC: 33 MMOL/L (ref 21–32)
CREAT SERPL-MCNC: 0.94 MG/DL (ref 0.5–1.05)
CRP SERPL-MCNC: 22.7 MG/DL
EGFRCR SERPLBLD CKD-EPI 2021: 67 ML/MIN/1.73M*2
EOSINOPHIL # BLD AUTO: 0.08 X10*3/UL (ref 0–0.7)
EOSINOPHIL NFR BLD AUTO: 0.6 %
ERYTHROCYTE [DISTWIDTH] IN BLOOD BY AUTOMATED COUNT: 12.4 % (ref 11.5–14.5)
ERYTHROCYTE [SEDIMENTATION RATE] IN BLOOD BY WESTERGREN METHOD: 44 MM/H (ref 0–30)
EST. AVERAGE GLUCOSE BLD GHB EST-MCNC: 117 MG/DL
GLUCOSE SERPL-MCNC: 118 MG/DL (ref 74–99)
HBA1C MFR BLD: 5.7 %
HCT VFR BLD AUTO: 39.5 % (ref 36–46)
HDLC SERPL-MCNC: 40.1 MG/DL
HGB BLD-MCNC: 12.5 G/DL (ref 12–16)
IMM GRANULOCYTES # BLD AUTO: 0.09 X10*3/UL (ref 0–0.7)
IMM GRANULOCYTES NFR BLD AUTO: 0.6 % (ref 0–0.9)
LDLC SERPL CALC-MCNC: 67 MG/DL
LYMPHOCYTES # BLD AUTO: 0.96 X10*3/UL (ref 1.2–4.8)
LYMPHOCYTES NFR BLD AUTO: 6.7 %
MCH RBC QN AUTO: 29.8 PG (ref 26–34)
MCHC RBC AUTO-ENTMCNC: 31.6 G/DL (ref 32–36)
MCV RBC AUTO: 94 FL (ref 80–100)
MONOCYTES # BLD AUTO: 1.15 X10*3/UL (ref 0.1–1)
MONOCYTES NFR BLD AUTO: 8.1 %
NEUTROPHILS # BLD AUTO: 11.95 X10*3/UL (ref 1.2–7.7)
NEUTROPHILS NFR BLD AUTO: 83.8 %
NON HDL CHOLESTEROL: 83 MG/DL (ref 0–149)
NRBC BLD-RTO: 0 /100 WBCS (ref 0–0)
PLATELET # BLD AUTO: 441 X10*3/UL (ref 150–450)
POTASSIUM SERPL-SCNC: 3.9 MMOL/L (ref 3.5–5.3)
PROT SERPL-MCNC: 6.9 G/DL (ref 6.4–8.2)
RBC # BLD AUTO: 4.19 X10*6/UL (ref 4–5.2)
SODIUM SERPL-SCNC: 142 MMOL/L (ref 136–145)
TRIGL SERPL-MCNC: 79 MG/DL (ref 0–149)
TSH SERPL-ACNC: 1.24 MIU/L (ref 0.44–3.98)
VIT B12 SERPL-MCNC: 424 PG/ML (ref 211–911)
VLDL: 16 MG/DL (ref 0–40)
WBC # BLD AUTO: 14.3 X10*3/UL (ref 4.4–11.3)

## 2024-05-01 PROCEDURE — 36415 COLL VENOUS BLD VENIPUNCTURE: CPT

## 2024-05-01 PROCEDURE — 80053 COMPREHEN METABOLIC PANEL: CPT

## 2024-05-01 PROCEDURE — 85025 COMPLETE CBC W/AUTO DIFF WBC: CPT

## 2024-05-01 PROCEDURE — 82306 VITAMIN D 25 HYDROXY: CPT

## 2024-05-01 PROCEDURE — 84443 ASSAY THYROID STIM HORMONE: CPT

## 2024-05-01 PROCEDURE — 85652 RBC SED RATE AUTOMATED: CPT

## 2024-05-01 PROCEDURE — 80061 LIPID PANEL: CPT

## 2024-05-01 PROCEDURE — 82607 VITAMIN B-12: CPT

## 2024-05-01 PROCEDURE — 83036 HEMOGLOBIN GLYCOSYLATED A1C: CPT

## 2024-05-01 PROCEDURE — 86140 C-REACTIVE PROTEIN: CPT

## 2024-05-03 ENCOUNTER — HOSPITAL ENCOUNTER (OUTPATIENT)
Dept: RADIOLOGY | Facility: EXTERNAL LOCATION | Age: 64
Discharge: HOME | End: 2024-05-03
Payer: COMMERCIAL

## 2024-05-03 DIAGNOSIS — K59.00 CONSTIPATION, UNSPECIFIED CONSTIPATION TYPE: ICD-10-CM

## 2024-05-06 ENCOUNTER — TELEPHONE (OUTPATIENT)
Dept: PRIMARY CARE | Facility: CLINIC | Age: 64
End: 2024-05-06
Payer: COMMERCIAL

## 2024-05-06 NOTE — TELEPHONE ENCOUNTER
Patient called back to let us know she is going to go to ER because she is really not feeling well.

## 2024-05-06 NOTE — TELEPHONE ENCOUNTER
Please explain that I will be out of the office for the next week,  Due to these symptoms I strongly encourage her to see one of my partners this week

## 2024-05-06 NOTE — TELEPHONE ENCOUNTER
Patient called and said that she went to  on Friday where she was positive for a UTI. They sent her to the ER because they thought she was developing sepsis. The ER determined that she was not but gave her IV antibiotics. The patient has been clammy and having night sweats for 2-3 weeks. She has an appointment with you on 5/28 but is asking to be seen sooner as these symptoms have been really taking a toll on her. She is requesting to only see you and I did at her appointment to a wait list. Please advise.

## 2024-05-16 ENCOUNTER — PATIENT OUTREACH (OUTPATIENT)
Dept: CARE COORDINATION | Facility: CLINIC | Age: 64
End: 2024-05-16
Payer: COMMERCIAL

## 2024-05-16 RX ORDER — CEPHALEXIN 500 MG/1
500 CAPSULE ORAL 3 TIMES DAILY
COMMUNITY
Start: 2024-05-04 | End: 2024-05-16 | Stop reason: ALTCHOICE

## 2024-05-16 RX ORDER — METRONIDAZOLE 500 MG/1
500 TABLET ORAL 3 TIMES DAILY
COMMUNITY
Start: 2024-05-15

## 2024-05-16 RX ORDER — AMOXICILLIN AND CLAVULANATE POTASSIUM 875; 125 MG/1; MG/1
875 TABLET, FILM COATED ORAL 2 TIMES DAILY
COMMUNITY
Start: 2024-05-15

## 2024-05-16 NOTE — PROGRESS NOTES
Discharge Facility: [facility]  Discharge Diagnosis: diverticular abscess  Admission Date: 5/10/24  Discharge Date: 5/15/24    Was transferred from [place] to [facility] on 5/10    PCP Appointment Date: 5/28/24  Specialist Appointment Date: 5/24/24 drain testing  6/6/24 Infectious Disease  TBD Dr Hauser/GI  Hospital Encounter and Summary: Linked   See discharge assessment below for further details    Engagement  Call Start Time: 1030 (5/16/2024 10:46 AM)    Medications  Medications reviewed with patient/caregiver?: Yes (5/16/2024 10:46 AM)  Is the patient having any side effects they believe may be caused by any medication additions or changes?: No (5/16/2024 10:46 AM)  Does the patient have all medications ordered at discharge?: Yes (5/16/2024 10:46 AM)  Care Management Interventions: No intervention needed (5/16/2024 10:46 AM)  Prescription Comments: On augmentin and flagryl through 6/6/24 (5/16/2024 10:46 AM)  Is the patient taking all medications as directed (includes completed medication regime)?: Yes (5/16/2024 10:46 AM)  Care Management Interventions: Provided patient education (5/16/2024 10:46 AM)  Medication Comments: No issues obtaining medications. Aware to take full course of antibiotics. Was not taking her HCTZ in hospital, hospital did not have this on her active med list but has since restarted at home due to some pedal edema. (5/16/2024 10:46 AM)    Appointments  Does the patient have a primary care provider?: Yes (5/16/2024 10:46 AM)  Care Management Interventions: Verified appointment date/time/provider (5/16/2024 10:46 AM)  Has the patient kept scheduled appointments due by today?: Yes (5/16/2024 10:46 AM)  Care Management Interventions: Advised to schedule with specialist (Has appt for drain study and infectious disease, will call to set up appt with surgeon/GI) (5/16/2024 10:46 AM)    Self Management  What is the home health agency?: none needed (5/16/2024 10:46 AM)  Has home health visited the  patient within 72 hours of discharge?: Not applicable (5/16/2024 10:46 AM)  What Durable Medical Equipment (DME) was ordered?: none needed (5/16/2024 10:46 AM)    Patient Teaching  Does the patient have access to their discharge instructions?: Yes (5/16/2024 10:46 AM)  Care Management Interventions: Reviewed instructions with patient (5/16/2024 10:46 AM)  What is the patient's perception of their health status since discharge?: Improving (5/16/2024 10:46 AM)  Is the patient/caregiver able to teach back the hierarchy of who to call/visit for symptoms/problems? PCP, Specialist, Home Health nurse, Urgent Care, ED, 911: Yes (5/16/2024 10:46 AM)  Patient/Caregiver Education Comments: Aware to call surgeon with any issues with drain, seek care for any return of fevers or chills. (5/16/2024 10:46 AM)    Wrap Up  Wrap Up Additional Comments: Brianna is doing well since she is home, feeling better and managing LOVELY drain. Aware of how to care for drain and emptying, has not had much drainage out. She will follow full liquid diet until Saturday then gradually increase low fiber soft foods. Aware to call office with any need for rescheduling appt. (5/16/2024 10:46 AM)  Call End Time: 1040 (5/16/2024 10:46 AM)         No

## 2024-05-28 ENCOUNTER — OFFICE VISIT (OUTPATIENT)
Dept: PRIMARY CARE | Facility: CLINIC | Age: 64
End: 2024-05-28
Payer: COMMERCIAL

## 2024-05-28 ENCOUNTER — LAB (OUTPATIENT)
Dept: LAB | Facility: LAB | Age: 64
End: 2024-05-28
Payer: COMMERCIAL

## 2024-05-28 VITALS
SYSTOLIC BLOOD PRESSURE: 92 MMHG | DIASTOLIC BLOOD PRESSURE: 63 MMHG | BODY MASS INDEX: 37.89 KG/M2 | OXYGEN SATURATION: 96 % | TEMPERATURE: 98.1 F | WEIGHT: 227.7 LBS | HEART RATE: 82 BPM

## 2024-05-28 DIAGNOSIS — E78.5 HYPERLIPIDEMIA, UNSPECIFIED HYPERLIPIDEMIA TYPE: ICD-10-CM

## 2024-05-28 DIAGNOSIS — I10 ESSENTIAL HYPERTENSION: ICD-10-CM

## 2024-05-28 DIAGNOSIS — K63.0 ABSCESS OF SIGMOID COLON: Primary | ICD-10-CM

## 2024-05-28 DIAGNOSIS — F32.A ANXIETY AND DEPRESSION: ICD-10-CM

## 2024-05-28 DIAGNOSIS — K21.9 GASTROESOPHAGEAL REFLUX DISEASE, UNSPECIFIED WHETHER ESOPHAGITIS PRESENT: ICD-10-CM

## 2024-05-28 DIAGNOSIS — Z09 ENCOUNTER FOR FOLLOW-UP: ICD-10-CM

## 2024-05-28 DIAGNOSIS — F41.9 ANXIETY AND DEPRESSION: ICD-10-CM

## 2024-05-28 DIAGNOSIS — K63.0 ABSCESS OF SIGMOID COLON: ICD-10-CM

## 2024-05-28 PROCEDURE — 3078F DIAST BP <80 MM HG: CPT | Performed by: FAMILY MEDICINE

## 2024-05-28 PROCEDURE — 3008F BODY MASS INDEX DOCD: CPT | Performed by: FAMILY MEDICINE

## 2024-05-28 PROCEDURE — 85025 COMPLETE CBC W/AUTO DIFF WBC: CPT

## 2024-05-28 PROCEDURE — 99495 TRANSJ CARE MGMT MOD F2F 14D: CPT | Performed by: FAMILY MEDICINE

## 2024-05-28 PROCEDURE — 36415 COLL VENOUS BLD VENIPUNCTURE: CPT

## 2024-05-28 PROCEDURE — 1160F RVW MEDS BY RX/DR IN RCRD: CPT | Performed by: FAMILY MEDICINE

## 2024-05-28 PROCEDURE — 3074F SYST BP LT 130 MM HG: CPT | Performed by: FAMILY MEDICINE

## 2024-05-28 PROCEDURE — 80053 COMPREHEN METABOLIC PANEL: CPT

## 2024-05-28 PROCEDURE — 1159F MED LIST DOCD IN RCRD: CPT | Performed by: FAMILY MEDICINE

## 2024-05-28 ASSESSMENT — ENCOUNTER SYMPTOMS
PSYCHIATRIC NEGATIVE: 1
HEMATOLOGIC/LYMPHATIC NEGATIVE: 1
ABDOMINAL PAIN: 1
ALLERGIC/IMMUNOLOGIC NEGATIVE: 1
CONSTITUTIONAL NEGATIVE: 1
RESPIRATORY NEGATIVE: 1
EYES NEGATIVE: 1
CHEST TIGHTNESS: 0
NAUSEA: 0
VOMITING: 0
SHORTNESS OF BREATH: 0
MUSCULOSKELETAL NEGATIVE: 1
DIARRHEA: 0
CARDIOVASCULAR NEGATIVE: 1
ENDOCRINE NEGATIVE: 1
NEUROLOGICAL NEGATIVE: 1

## 2024-05-28 NOTE — PATIENT INSTRUCTIONS
1.  Recent hospitalization due to colonic abscess.    You were hospitalized due to 2 abscesses that formed off of the colon.    You are currently under the care of of one of the colorectal specialist through the [facility].  Currently taking antibiotics Augmentin and metronidazole.    Slowly advancing your diet.    No new fevers chills or sweats to report    I recommended that you continue with all current treatment plans.    Please have labs done checking kidney function liver function blood sugar electrolytes including potassium.  Also checking your white blood cell count and your hemoglobin.    We will notify you of all those results.    I do agree that you will most likely need a colonoscopy and then a partial colectomy to remove the section of colon so it does not recur    Continue following with the plans in regards to the drain that you have placed in the abdomen.    If you should start feeling worse in any way please do not hesitate to contact me immediately I will get you in the office to see you trying to avoid hospitalization or emergency department.  Certainly you could also call your colorectal surgeon as well    For now keep the appointment to see me in August    We will contact you with results of labs you will contact me if anything changes

## 2024-05-28 NOTE — PROGRESS NOTES
Subjective   Patient ID: Brianna Olivo is a 64 y.o. female who presents for Follow-up (Hospital fuv diverticular abscess, states feeling extreme weakness, fatigue today and drain for another two weeks and still taking the antibiotics, does have appt. August 13 which she states may have to cancel).    HPI     Hyperlipidemia: The patient is presenting today for a follow up of hyperlipidemia. The patient has not been hospitalized for this in the last 6 months. The patient is compliant with medications. Patient denies any side effects to the medications.     Hypertension: The patient is here for an evaluation of elevated blood pressure. The patient is trying to follow a low-salt diet. She is adherent to a low salt diet. Blood pressure is well controlled at home, with ranges being in not doing. The patient has not been hospitalized for this in the last 6 months. The patient is compliant with medications, which are currently  HYDROCHLOROTHIAZIDE . Patient denies any side effects to the medications.     GERD  The patient presents today with symptoms of GERD. Pt relates that pain started after a largemeal, but not sure if the pain associated with a meal. No previous hx of abdominal pain. No aggravating factors reported. Pain is typically alleviated by the use ofProtonix. The patient reports No  change in BM. Pt is only taking Acetaminophen, denies taking any other OTC meds. Patient denies recent trauma.     Anxiety:  The patient is presenting today for a follow up of anxiety disorder. She has the following anxiety symptoms: difficulty concentrating, racing thoughts. Symptoms have been not been a problem since that time. She denies having any current suicidal and/or homicidal ideation.  The patient has not been hospitalized for this in the last 6 months.  Patient denies any side effects to the medications.     The patient denies any changes in vision, hearing or dental.     The patient maintains they do not have any  chest pain, chest tightness or shortness of breath.    They do not experience nausea, emesis, changes in bowel movements or dyspepsia.    The patient denies nocturia. They report urinating 1 times a night. The nocturia does not cause sleep disturbances.     The patient denies any issues with erections.    The patient's colonoscopy is not up to date.    The patient's mammogram is up to date.    The patient's vaccinations are up to date.      Review of Systems   Constitutional: Negative.    HENT: Negative.  Negative for dental problem and hearing loss.    Eyes: Negative.  Negative for visual disturbance.   Respiratory: Negative.  Negative for chest tightness and shortness of breath.    Cardiovascular: Negative.  Negative for chest pain.   Gastrointestinal:  Positive for abdominal pain. Negative for diarrhea, nausea and vomiting.   Endocrine: Negative.    Genitourinary: Negative.    Musculoskeletal: Negative.    Skin: Negative.    Allergic/Immunologic: Negative.    Neurological: Negative.    Hematological: Negative.    Psychiatric/Behavioral: Negative.         Objective   BP 92/63 (BP Location: Left arm, Patient Position: Sitting, BP Cuff Size: Large adult)   Pulse 82   Temp 36.7 °C (98.1 °F) (Temporal)   Wt 103 kg (227 lb 11.2 oz)   SpO2 96%   BMI 37.89 kg/m²     Physical Exam  Constitutional:       Appearance: Normal appearance.   HENT:      Head: Normocephalic and atraumatic.      Nose: Nose normal.   Eyes:      Extraocular Movements: Extraocular movements intact.      Conjunctiva/sclera: Conjunctivae normal.      Pupils: Pupils are equal, round, and reactive to light.   Cardiovascular:      Rate and Rhythm: Normal rate and regular rhythm.      Pulses: Normal pulses.      Heart sounds: Normal heart sounds.   Pulmonary:      Effort: Pulmonary effort is normal.      Breath sounds: Normal breath sounds.   Abdominal:      General: Bowel sounds are normal.      Palpations: Abdomen is soft.   Genitourinary:      General: Normal vulva.      Rectum: Normal.   Musculoskeletal:         General: Normal range of motion.      Cervical back: Normal range of motion and neck supple.   Skin:     General: Skin is warm.   Neurological:      Mental Status: She is alert and oriented to person, place, and time.   Psychiatric:         Mood and Affect: Mood normal.         Behavior: Behavior normal.         Thought Content: Thought content normal.         Judgment: Judgment normal.         Assessment/Plan          1. Abscess of sigmoid colon  CBC and Auto Differential    Comprehensive metabolic panel      2. Gastroesophageal reflux disease, unspecified whether esophagitis present        3. Hyperlipidemia, unspecified hyperlipidemia type        4. Essential hypertension        5. Anxiety and depression        6. Encounter for follow-up          1.  Recent hospitalization due to colonic abscess.    You were hospitalized due to 2 abscesses that formed off of the colon.    You are currently under the care of of one of the colorectal specialist through the [facility].  Currently taking antibiotics Augmentin and metronidazole.    Slowly advancing your diet.    No new fevers chills or sweats to report    I recommended that you continue with all current treatment plans.    Please have labs done checking kidney function liver function blood sugar electrolytes including potassium.  Also checking your white blood cell count and your hemoglobin.    We will notify you of all those results.    I do agree that you will most likely need a colonoscopy and then a partial colectomy to remove the section of colon so it does not recur    Continue following with the plans in regards to the drain that you have placed in the abdomen.    If you should start feeling worse in any way please do not hesitate to contact me immediately I will get you in the office to see you trying to avoid hospitalization or emergency department.  Certainly you could also call your  colorectal surgeon as well    For now keep the appointment to see me in August    We will contact you with results of labs you will contact me if anything changes    Follow-up in 6 months or sooner if there are any concerns.    Scribe Attestation  By signing my name below, ILizeth, Scribe   attest that this documentation has been prepared under the direction and in the presence of Amandeep Mora MD.    This note has been transcribed using a medical scribe and there is a possibility of unintentional typing misprints.

## 2024-05-29 PROBLEM — K63.0 ABSCESS OF SIGMOID COLON: Status: ACTIVE | Noted: 2024-05-29

## 2024-05-29 LAB
ALBUMIN SERPL BCP-MCNC: 4 G/DL (ref 3.4–5)
ALP SERPL-CCNC: 73 U/L (ref 33–136)
ALT SERPL W P-5'-P-CCNC: 22 U/L (ref 7–45)
ANION GAP SERPL CALC-SCNC: 16 MMOL/L (ref 10–20)
AST SERPL W P-5'-P-CCNC: 50 U/L (ref 9–39)
BASOPHILS # BLD AUTO: 0.06 X10*3/UL (ref 0–0.1)
BASOPHILS NFR BLD AUTO: 0.8 %
BILIRUB SERPL-MCNC: 0.8 MG/DL (ref 0–1.2)
BUN SERPL-MCNC: 18 MG/DL (ref 6–23)
CALCIUM SERPL-MCNC: 10.6 MG/DL (ref 8.6–10.6)
CHLORIDE SERPL-SCNC: 96 MMOL/L (ref 98–107)
CO2 SERPL-SCNC: 32 MMOL/L (ref 21–32)
CREAT SERPL-MCNC: 0.76 MG/DL (ref 0.5–1.05)
EGFRCR SERPLBLD CKD-EPI 2021: 87 ML/MIN/1.73M*2
EOSINOPHIL # BLD AUTO: 0.27 X10*3/UL (ref 0–0.7)
EOSINOPHIL NFR BLD AUTO: 3.7 %
ERYTHROCYTE [DISTWIDTH] IN BLOOD BY AUTOMATED COUNT: 14.4 % (ref 11.5–14.5)
GLUCOSE SERPL-MCNC: 126 MG/DL (ref 74–99)
HCT VFR BLD AUTO: 43.2 % (ref 36–46)
HGB BLD-MCNC: 13.8 G/DL (ref 12–16)
IMM GRANULOCYTES # BLD AUTO: 0.05 X10*3/UL (ref 0–0.7)
IMM GRANULOCYTES NFR BLD AUTO: 0.7 % (ref 0–0.9)
LYMPHOCYTES # BLD AUTO: 1.2 X10*3/UL (ref 1.2–4.8)
LYMPHOCYTES NFR BLD AUTO: 16.6 %
MCH RBC QN AUTO: 30.3 PG (ref 26–34)
MCHC RBC AUTO-ENTMCNC: 31.9 G/DL (ref 32–36)
MCV RBC AUTO: 95 FL (ref 80–100)
MONOCYTES # BLD AUTO: 0.87 X10*3/UL (ref 0.1–1)
MONOCYTES NFR BLD AUTO: 12 %
NEUTROPHILS # BLD AUTO: 4.77 X10*3/UL (ref 1.2–7.7)
NEUTROPHILS NFR BLD AUTO: 66.2 %
NRBC BLD-RTO: 0 /100 WBCS (ref 0–0)
PLATELET # BLD AUTO: 437 X10*3/UL (ref 150–450)
POTASSIUM SERPL-SCNC: 3.5 MMOL/L (ref 3.5–5.3)
PROT SERPL-MCNC: 7.7 G/DL (ref 6.4–8.2)
RBC # BLD AUTO: 4.56 X10*6/UL (ref 4–5.2)
SODIUM SERPL-SCNC: 140 MMOL/L (ref 136–145)
WBC # BLD AUTO: 7.2 X10*3/UL (ref 4.4–11.3)

## 2024-05-29 NOTE — PROGRESS NOTES
"Patient: Brianna Olivo  : 1960  PCP: Amandeep Mora MD  MRN: [id number]  Program: Transitional Care Management  Status: Enrolled  Effective Dates: 2024 - present  Responsible Staff: Va Oliveira RN  Social Determinants to be Addressed: No information to display         Brianna Olivo is a 64 y.o. female presenting today for follow-up after being discharged from the hospital 13 days ago. The main problem requiring admission was abscess. The discharge summary and/or Transitional Care Management documentation was reviewed. Medication reconciliation was performed as indicated via the \"Eder as Reviewed\" timestamp.     Brianna Olivo was contacted by Transitional Care Management services two days after her discharge. This encounter and supporting documentation was reviewed.    Review of Systems    BP 92/63 (BP Location: Left arm, Patient Position: Sitting, BP Cuff Size: Large adult)   Pulse 82   Temp 36.7 °C (98.1 °F) (Temporal)   Wt 103 kg (227 lb 11.2 oz)   SpO2 96%   BMI 37.89 kg/m²     Physical Exam    The complexity of medical decision making for this patient's transitional care is moderate.    Assessment/Plan   Problem List Items Addressed This Visit             ICD-10-CM    Anxiety and depression F41.9, F32.A    Essential hypertension I10    GERD (gastroesophageal reflux disease) K21.9    Hyperlipidemia E78.5    Abscess of sigmoid colon - Primary K63.0    Relevant Orders    CBC and Auto Differential (Completed)    Comprehensive metabolic panel (Completed)     Other Visit Diagnoses         Codes    Encounter for follow-up     Z09            "

## 2024-05-30 ENCOUNTER — PATIENT OUTREACH (OUTPATIENT)
Dept: CARE COORDINATION | Facility: CLINIC | Age: 64
End: 2024-05-30
Payer: COMMERCIAL

## 2024-05-30 NOTE — PROGRESS NOTES
Call regarding appt. with PCP on 5/28/24 after hospitalization.  At time of outreach call the patient feels as if their condition has improved since last visit.  Reviewed the PCP appointment with the pt and addressed any questions or concerns. Has appts with infectious disease and surgeon in the coming weeks.

## 2024-07-03 ENCOUNTER — PATIENT OUTREACH (OUTPATIENT)
Dept: CARE COORDINATION | Facility: CLINIC | Age: 64
End: 2024-07-03
Payer: COMMERCIAL

## 2024-08-07 ENCOUNTER — PATIENT OUTREACH (OUTPATIENT)
Dept: CARE COORDINATION | Facility: CLINIC | Age: 64
End: 2024-08-07
Payer: COMMERCIAL

## 2024-08-07 NOTE — PROGRESS NOTES
Final call. Called and spoke with patient to address any questions or concerns regarding hospitalization.   Patient reports their condition has improved  Patient encouraged to keep my contact information in case any needs arise.  Has colonoscopy next week and also plans for surgery in October.

## 2024-08-13 ENCOUNTER — APPOINTMENT (OUTPATIENT)
Dept: PRIMARY CARE | Facility: CLINIC | Age: 64
End: 2024-08-13
Payer: COMMERCIAL

## 2024-09-06 ENCOUNTER — TELEPHONE (OUTPATIENT)
Dept: PRIMARY CARE | Facility: CLINIC | Age: 64
End: 2024-09-06
Payer: COMMERCIAL

## 2024-09-06 ENCOUNTER — PATIENT OUTREACH (OUTPATIENT)
Dept: PRIMARY CARE | Facility: CLINIC | Age: 64
End: 2024-09-06
Payer: COMMERCIAL

## 2024-09-06 RX ORDER — OXYCODONE HYDROCHLORIDE 5 MG/1
5 CAPSULE ORAL EVERY 6 HOURS PRN
COMMUNITY
Start: 2024-09-05 | End: 2024-09-12

## 2024-09-06 RX ORDER — CETIRIZINE HYDROCHLORIDE 10 MG/1
10 TABLET, CHEWABLE ORAL DAILY
COMMUNITY

## 2024-09-06 NOTE — TELEPHONE ENCOUNTER
Opal Erickson, ANMOL  [facility] [place] Clerical  This patient was discharged from:  [facility]  Discharge diagnosis:   Post op-diverticulits  Date of discharge:     9.5.24      No PCP appointments available within 14 days of discharge  Please reach out to patient and schedule an appointment within 7-13 days from discharge date

## 2024-09-06 NOTE — PROGRESS NOTES
Discharge Facility:[facility]  Discharge Diagnosis:Diverticular disease; s/p 9/3 LAPAROSCOPIC COLECTOMY SIGMOID COLON W/ COLORECTAL ANASTOMOSIS  Admission Date:9.3.24  Discharge Date: 9.5.24    PCP Appointment Date:Messaged office as follow up appt was not available within 14 days     Specialist Appointment Date:   Hospital Encounter and Summary Linked: Yes  See discharge assessment below for further details   Engagement  Call Start Time: 1315 (9/6/2024  1:11 PM)    Medications  Medications reviewed with patient/caregiver?: Yes (9/6/2024  1:11 PM)  Is the patient having any side effects they believe may be caused by any medication additions or changes?: No (9/6/2024  1:11 PM)  Does the patient have all medications ordered at discharge?: Yes (9/6/2024  1:11 PM)  Care Management Interventions: No intervention needed (9/6/2024  1:11 PM)  Is the patient taking all medications as directed (includes completed medication regime)?: Yes (9/6/2024  1:11 PM)  Care Management Interventions: Provided patient education (9/6/2024  1:11 PM)  Medication Comments: Verbalizes understanding of medication changes (9/6/2024  1:11 PM)    Appointments  Does the patient have a primary care provider?: Yes (9/6/2024  1:11 PM)  Care Management Interventions: Educated patient on importance of making appointment (Messaged office as follow up appt was not available within 14 days) (9/6/2024  1:11 PM)  Has the patient kept scheduled appointments due by today?: Yes (9/6/2024  1:11 PM)    Self Management  What is the home health agency?: n/a (9/6/2024  1:11 PM)  What Durable Medical Equipment (DME) was ordered?: n/a (9/6/2024  1:11 PM)    Patient Teaching  Does the patient have access to their discharge instructions?: Yes (9/6/2024  1:11 PM)  Care Management Interventions: Reviewed instructions with patient (9/6/2024  1:11 PM)  What is the patient's perception of their health status since discharge?: Improving (9/6/2024  1:11 PM)  Is the patient/caregiver  able to teach back the hierarchy of who to call/visit for symptoms/problems? PCP, Specialist, Home Health nurse, Urgent Care, ED, 911: Yes (9/6/2024  1:11 PM)  Patient/Caregiver Education Comments: Spoke with patient. Doing well post op. Incision intact. Is able to eat and drink. Is taking pain med as ordered.Messaged office as follow up appt was not available within 14 days as patient does wish to see PCP. (9/6/2024  1:11 PM)    ,

## 2024-09-09 NOTE — TELEPHONE ENCOUNTER
Pt stated she does not not need an appointment here since she is seeing the Surgeon on 9/20   Declines scheduling

## 2024-09-14 DIAGNOSIS — I10 ESSENTIAL (PRIMARY) HYPERTENSION: ICD-10-CM

## 2024-09-16 RX ORDER — HYDROCHLOROTHIAZIDE 25 MG/1
25 TABLET ORAL DAILY
Qty: 90 TABLET | Refills: 3 | Status: SHIPPED | OUTPATIENT
Start: 2024-09-16

## 2024-09-20 DIAGNOSIS — E78.2 MIXED HYPERLIPIDEMIA: ICD-10-CM

## 2024-09-20 RX ORDER — ATORVASTATIN CALCIUM 40 MG/1
40 TABLET, FILM COATED ORAL DAILY
Qty: 90 TABLET | Refills: 3 | Status: SHIPPED | OUTPATIENT
Start: 2024-09-20

## 2024-09-26 ENCOUNTER — PATIENT OUTREACH (OUTPATIENT)
Dept: PRIMARY CARE | Facility: CLINIC | Age: 64
End: 2024-09-26
Payer: COMMERCIAL

## 2024-09-26 NOTE — PROGRESS NOTES
Call after recent hospital discharge.   At time of outreach call the patient feels as if their condition has improved since discharge.

## 2024-10-27 ENCOUNTER — PATIENT OUTREACH (OUTPATIENT)
Dept: PRIMARY CARE | Facility: CLINIC | Age: 64
End: 2024-10-27
Payer: COMMERCIAL

## 2024-12-01 DIAGNOSIS — F43.20 ADJUSTMENT DISORDER, UNSPECIFIED TYPE: ICD-10-CM

## 2024-12-02 RX ORDER — ESCITALOPRAM OXALATE 20 MG/1
20 TABLET ORAL DAILY
Qty: 90 TABLET | Refills: 3 | Status: SHIPPED | OUTPATIENT
Start: 2024-12-02 | End: 2025-12-02

## 2024-12-05 ENCOUNTER — PATIENT OUTREACH (OUTPATIENT)
Dept: PRIMARY CARE | Facility: CLINIC | Age: 64
End: 2024-12-05
Payer: COMMERCIAL

## 2025-01-27 ENCOUNTER — APPOINTMENT (OUTPATIENT)
Dept: PRIMARY CARE | Facility: CLINIC | Age: 65
End: 2025-01-27
Payer: COMMERCIAL

## 2025-01-28 ENCOUNTER — TELEPHONE (OUTPATIENT)
Dept: PRIMARY CARE | Facility: CLINIC | Age: 65
End: 2025-01-28

## 2025-01-28 NOTE — TELEPHONE ENCOUNTER
Pt called in requesting a refill on Folic Acid 1 mg and also wants recommendations on a  rheumatologist  in [facility]

## 2025-01-29 DIAGNOSIS — M05.9 RHEUMATOID ARTHRITIS WITH POSITIVE RHEUMATOID FACTOR, INVOLVING UNSPECIFIED SITE (MULTI): Primary | ICD-10-CM

## 2025-01-29 RX ORDER — FOLIC ACID 1 MG/1
1 TABLET ORAL DAILY
Qty: 90 TABLET | Refills: 3 | Status: SHIPPED | OUTPATIENT
Start: 2025-01-29 | End: 2026-01-29

## 2025-03-15 DIAGNOSIS — K21.9 GASTROESOPHAGEAL REFLUX DISEASE WITHOUT ESOPHAGITIS: ICD-10-CM

## 2025-03-17 RX ORDER — PANTOPRAZOLE SODIUM 40 MG/1
40 TABLET, DELAYED RELEASE ORAL DAILY
Qty: 90 TABLET | Refills: 3 | Status: SHIPPED | OUTPATIENT
Start: 2025-03-17

## 2025-07-22 DIAGNOSIS — E78.2 MIXED HYPERLIPIDEMIA: ICD-10-CM

## 2025-07-23 RX ORDER — ATORVASTATIN CALCIUM 40 MG/1
40 TABLET, FILM COATED ORAL DAILY
Qty: 90 TABLET | Refills: 3 | Status: SHIPPED | OUTPATIENT
Start: 2025-07-23

## 2025-08-01 ENCOUNTER — APPOINTMENT (OUTPATIENT)
Dept: PRIMARY CARE | Facility: CLINIC | Age: 65
End: 2025-08-01
Payer: COMMERCIAL

## 2025-08-06 DIAGNOSIS — I10 ESSENTIAL (PRIMARY) HYPERTENSION: ICD-10-CM

## 2025-08-06 RX ORDER — HYDROCHLOROTHIAZIDE 25 MG/1
25 TABLET ORAL DAILY
Qty: 90 TABLET | Refills: 3 | Status: SHIPPED | OUTPATIENT
Start: 2025-08-06

## 2025-08-20 ENCOUNTER — TELEPHONE (OUTPATIENT)
Dept: PRIMARY CARE | Facility: CLINIC | Age: 65
End: 2025-08-20
Payer: COMMERCIAL

## 2026-01-26 ENCOUNTER — APPOINTMENT (OUTPATIENT)
Dept: PRIMARY CARE | Facility: CLINIC | Age: 66
End: 2026-01-26
Payer: COMMERCIAL